# Patient Record
Sex: FEMALE | Race: OTHER | HISPANIC OR LATINO | Employment: UNEMPLOYED | URBAN - METROPOLITAN AREA
[De-identification: names, ages, dates, MRNs, and addresses within clinical notes are randomized per-mention and may not be internally consistent; named-entity substitution may affect disease eponyms.]

---

## 2023-05-26 ENCOUNTER — APPOINTMENT (EMERGENCY)
Dept: RADIOLOGY | Facility: HOSPITAL | Age: 28
End: 2023-05-26

## 2023-05-26 ENCOUNTER — HOSPITAL ENCOUNTER (EMERGENCY)
Facility: HOSPITAL | Age: 28
Discharge: HOME | End: 2023-05-26
Attending: EMERGENCY MEDICINE

## 2023-05-26 ENCOUNTER — APPOINTMENT (EMERGENCY)
Dept: NON INVASIVE DIAGNOSTICS | Facility: HOSPITAL | Age: 28
End: 2023-05-26

## 2023-05-26 VITALS
RESPIRATION RATE: 18 BRPM | OXYGEN SATURATION: 100 % | DIASTOLIC BLOOD PRESSURE: 85 MMHG | TEMPERATURE: 98.9 F | SYSTOLIC BLOOD PRESSURE: 138 MMHG | HEART RATE: 90 BPM

## 2023-05-26 DIAGNOSIS — M25.571 RIGHT ANKLE PAIN: Primary | ICD-10-CM

## 2023-05-26 DIAGNOSIS — M79.661 PAIN IN RIGHT LOWER LEG: ICD-10-CM

## 2023-05-26 RX ORDER — NAPROXEN 500 MG/1
500 TABLET ORAL 2 TIMES DAILY WITH MEALS
Qty: 30 TABLET | Refills: 0 | Status: SHIPPED | OUTPATIENT
Start: 2023-05-26

## 2023-05-26 RX ORDER — METHOCARBAMOL 750 MG/1
750 TABLET, FILM COATED ORAL EVERY 6 HOURS PRN
Qty: 10 TABLET | Refills: 0 | Status: SHIPPED | OUTPATIENT
Start: 2023-05-26

## 2023-05-26 RX ORDER — METHOCARBAMOL 500 MG/1
500 TABLET, FILM COATED ORAL ONCE
Status: COMPLETED | OUTPATIENT
Start: 2023-05-26 | End: 2023-05-26

## 2023-05-26 RX ORDER — KETOROLAC TROMETHAMINE 30 MG/ML
15 INJECTION, SOLUTION INTRAMUSCULAR; INTRAVENOUS ONCE
Status: COMPLETED | OUTPATIENT
Start: 2023-05-26 | End: 2023-05-26

## 2023-05-26 RX ADMIN — METHOCARBAMOL 500 MG: 500 TABLET ORAL at 13:41

## 2023-05-26 RX ADMIN — KETOROLAC TROMETHAMINE 15 MG: 30 INJECTION, SOLUTION INTRAMUSCULAR; INTRAVENOUS at 13:42

## 2023-05-26 NOTE — DISCHARGE INSTRUCTIONS
INSTRUCCIONES DE DESCARGA:    SEGUIMIENTO CON SUÁREZ PROVEEDOR DE ATENCIÓN PRIMARIA O LA CLÍNICA DE GERBER [address]  TOD MARIN SOCORRO PARA SER ATENDIDOS  TOME LA MEDICACIÓN SEGÚN LA PRESCRIPCIÓN  EN SUNNI DE Erupción, dificultad para respirar o dificultad para tragar, DEJE DE THALIA EL MEDICAMENTO Y HÁGASE NOHEMI  [address]  TOD MARIN SOCORRO PARA SER ATENDIDOS  [place] ELEVA LA OLIVIA  SI LOS SÍNTOMAS EMPEORAN O SURGEN NUEVOS SÍNTOMAS, [address] A LA URGENCIA PARA QUE LO [address] [facility]  DISCHARGE INSTRUCTIONS:    FOLLOW UP WITH YOUR PRIMARY CARE PROVIDER OR  [facility]  MAKE AN APPOINTMENT TO BE SEEN  TAKE MEDICATION AS PRESCRIBED  IF RASH, SHORTNESS OF BREATH OR TROUBLE SWALLOWING, STOP TAKING THE MEDICATION AND BE SEEN  FOLLOW UP WITH THE RECOMMENDED ORTHOPEDIC SPECIALIST  MAKE AN APPOINTMENT TO BE SEEN  REST, ICE AND ELEVATE THE AREA  IF SYMPTOMS WORSEN OR NEW SYMPTOMS ARISE, RETURN TO THE ER TO BE SEEN

## 2023-05-26 NOTE — ED PROVIDER NOTES
History  Chief Complaint   Patient presents with   • Foot Pain     Pt c/o right foot pain, ankle and knee pain  Denies injury  States that she can't bear weight on her right foot       28y  o female with no significant PMH presents to the ER for right leg pain for 2 days  Patient states she was working out at [employer] and after that began with leg pain  She tried taking a muscle relaxer for pain without relief  She describes her pain as sharp and non-radiating  Pain is constant but worse when bearing weight  She denies fever, chills, URI symptoms, chest pain, dyspnea, N/V/D, abdominal pain, weakness or paresthesias  She denies directly injuring the area  She does take birth control  She denies long plane/car rides, recent surgery, recent pregnancy or history of blood clots  History provided by:  Patient   used: Yes (Iizuu)        None       History reviewed  No pertinent past medical history  History reviewed  No pertinent surgical history  History reviewed  No pertinent family history  I have reviewed and agree with the history as documented  E-Cigarette/Vaping     E-Cigarette/Vaping Substances     Social History     Tobacco Use   • Smoking status: Never   • Smokeless tobacco: Never   Substance Use Topics   • Alcohol use: Yes     Comment: social   • Drug use: Never       Review of Systems   Constitutional: Negative for activity change, appetite change, chills and fever  HENT: Negative for congestion, drooling, ear discharge, ear pain, facial swelling, rhinorrhea and sore throat  Eyes: Negative for redness  Respiratory: Negative for cough and shortness of breath  Cardiovascular: Negative for chest pain  Gastrointestinal: Negative for abdominal pain, diarrhea, nausea and vomiting  Musculoskeletal: Negative for neck stiffness  Skin: Negative for rash  Allergic/Immunologic: Negative for food allergies  Neurological: Negative for weakness and numbness  Physical Exam  Physical Exam  Vitals and nursing note reviewed  Constitutional:       General: She is not in acute distress  Appearance: She is not toxic-appearing  HENT:      Head: Normocephalic and atraumatic  Eyes:      Conjunctiva/sclera: Conjunctivae normal    Neck:      Trachea: No tracheal deviation  Cardiovascular:      Rate and Rhythm: Normal rate and regular rhythm  Heart sounds: Normal heart sounds, S1 normal and S2 normal  No murmur heard  No friction rub  No gallop  Pulmonary:      Effort: Pulmonary effort is normal  No respiratory distress  Breath sounds: Normal breath sounds  No decreased breath sounds, wheezing, rhonchi or rales  Chest:      Chest wall: No tenderness  Abdominal:      General: There is no distension  Musculoskeletal:      Cervical back: Normal range of motion and neck supple  Right upper leg: Normal  No swelling, edema, deformity, lacerations, tenderness or bony tenderness  Right knee: Normal       Right lower leg: Tenderness (calf) present  No swelling or deformity  No edema  Right ankle: No swelling, deformity, ecchymosis or lacerations  Tenderness present over the lateral malleolus and medial malleolus  Decreased range of motion (due to pain)  Normal pulse  Right foot: Normal range of motion  Tenderness and bony tenderness present  No swelling, deformity, laceration or crepitus  Normal pulse  Comments: Right calf, right ankle and right foot are tender to palpation  No erythema, swelling, ecchymosis or deformity  Normal ROM of knee and toes  Decreased ROM of ankle due to pain  Neurovascularly intact  Skin:     General: Skin is warm and dry  Findings: No rash  Neurological:      Mental Status: She is alert  GCS: GCS eye subscore is 4  GCS verbal subscore is 5  GCS motor subscore is 6     Psychiatric:         Mood and Affect: Mood normal          Vital Signs  ED Triage Vitals   Temperature Pulse Respirations Blood Pressure SpO2   05/26/23 1301 05/26/23 1301 05/26/23 1301 05/26/23 1301 05/26/23 1301   98 9 °F (37 2 °C) 90 18 138/85 100 %      Temp Source Heart Rate Source Patient Position - Orthostatic VS BP Location FiO2 (%)   05/26/23 1301 05/26/23 1301 -- -- --   Oral Monitor         Pain Score       05/26/23 1342       10 - Worst Possible Pain           Vitals:    05/26/23 1301   BP: 138/85   Pulse: 90         Visual Acuity      ED Medications  Medications   ketorolac (TORADOL) injection 15 mg (15 mg Intramuscular Given 5/26/23 1342)   methocarbamol (ROBAXIN) tablet 500 mg (500 mg Oral Given 5/26/23 1341)       Diagnostic Studies  Results Reviewed     None                 XR tibia fibula 2 views RIGHT   ED Interpretation by Claire Tran PA-C (05/26 1423)   No acute abnormalities seen by me at this time  XR foot 3+ views RIGHT   ED Interpretation by Claire Tran PA-C (05/26 1423)   No acute abnormalities seen by me at this time  VAS lower limb venous duplex study, unilateral/limited    (Results Pending)              Procedures  Orthopedic injury treatment    Date/Time: 5/26/2023 2:30 PM    Performed by: Claire Tran PA-C  Authorized by: Claire Tran PA-C    Patient Location:  ED  [place] Protocol:  Procedure performed by: (ED RN)  Consent: Verbal consent obtained  Consent given by: patient  Patient understanding: patient states understanding of the procedure being performed  Radiology Images displayed and confirmed  If images not available, report reviewed: imaging studies available  Patient identity confirmed: arm band      Injury location:  Ankle  Location details:  Right ankle  Injury type:   Soft tissue  Neurovascular status: Neurovascularly intact    Distal perfusion: normal    Neurological function: normal    Range of motion: reduced    Local anesthesia used?: No    General anesthesia used?: No    Immobilization:  Crutches and other (comment) (air cast)  Neurovascular status: Neurovascularly intact    Distal perfusion: normal    Neurological function: normal    Range of motion: unchanged    Patient tolerance:  Patient tolerated the procedure well with no immediate complications             ED Course  ED Course as of 05/26/23 1510   Fri May 26, 2023   1430 VAS lower limb venous duplex study, unilateral/limited  Per US technician, patient is negative for DVT  Medical Decision Making  28y  o female presents to the ER for right leg pain for 2 days  Vitals are stable  Patient is in no acute distress  On exam, lungs are clear  Heart is regular rate and rhythm  Abdomen is not distended  Right calf, right ankle and right foot are tender to palpation  No erythema, swelling, ecchymosis or deformity  Normal ROM of knee and toes  Decreased ROM of ankle due to pain  Neurovascularly intact  Will check xray and duplex  1430 VAS lower limb venous duplex study, unilateral/limited - Per US technician, patient is negative for DVT  1430    Informed patient I did not see any acute abnormalities on xray at this time and if the radiologist saw anything concerning when reading the xray, we would call to inform them  Patient agreeable  Will place in air cast and give crutches  Patient reports improvement in symptoms with medication  Will discharge home with medication  The management plan was discussed in detail with the patient at bedside and all questions were answered  Prior to discharge, we provided both verbal and written instructions  We discussed with the patient the signs and symptoms for which to return to the emergency department  All questions were answered and patient was comfortable with the plan of care and discharged to home  Instructed the patient to follow up with the primary care provider and/or specialist provided and their written instructions    The patient verbalized understanding of our discussion and plan of care, and agrees to return to the Emergency Department for concerns and progression of illness  At discharge, I instructed the patient to:  -follow up with pcp  -take Naproxen and Robaxin as prescribed  -rest, ice and elevate ankle  -follow up with orthopedics  -return to the ER if symptoms worsened or new symptoms arose  Patient agreed to this plan and was stable at time of discharge  Pain in right lower leg: acute illness or injury  Right ankle pain: acute illness or injury  Amount and/or Complexity of Data Reviewed  Independent Historian:      Details: patient is historian  Radiology: ordered and independent interpretation performed  Decision-making details documented in ED Course  Risk  Prescription drug management  Disposition  Final diagnoses:   Right ankle pain   Pain in right lower leg     Time reflects when diagnosis was documented in both MDM as applicable and the Disposition within this note     Time User Action Codes Description Comment    5/26/2023  2:31 PM Nicholas Leung [address] Right ankle pain     5/26/2023  2:31 PM Odilia LIMON Add [D60 922] Pain in right lower leg       ED Disposition     ED Disposition   Discharge    Condition   Stable    Date/Time   Fri May 26, 2023  2:31 PM    Comment   Danny Burdick discharge to home/self care                 Follow-up Information     Follow up With Specialties Details Why Contact Info Additional [address] [facility] Schedule an appointment as soon as possible for a visit  As needed [address], [address] [facility] [address]  [address], [address], [address] Orthopedic Surgery Schedule an appointment as soon as possible for a visit   [address]  Elie [address] [facility] [address]  [address] Specialists tana, [address], [address] [facility], [place], [place], [address] [phone]          Discharge Medication List as of 5/26/2023  2:47 PM      START taking these medications    Details   methocarbamol (ROBAXIN) 750 mg tablet Take 1 tablet (750 mg total) by mouth every 6 (six) hours as needed for muscle spasms, Starting Fri 5/26/2023, Normal      naproxen (NAPROSYN) 500 mg tablet Take 1 tablet (500 mg total) by mouth 2 (two) times a day with meals, Starting Fri 5/26/2023, Normal             No discharge procedures on file      PDMP Review     None          ED Provider  Electronically Signed by           Irasema Gaitan PA-C  05/26/23 2571

## 2023-05-30 ENCOUNTER — HOSPITAL ENCOUNTER (EMERGENCY)
Facility: HOSPITAL | Age: 28
Discharge: HOME | End: 2023-05-30
Attending: EMERGENCY MEDICINE | Admitting: EMERGENCY MEDICINE

## 2023-05-30 VITALS
DIASTOLIC BLOOD PRESSURE: 84 MMHG | TEMPERATURE: 98.4 F | SYSTOLIC BLOOD PRESSURE: 111 MMHG | HEIGHT: 68 IN | BODY MASS INDEX: 33.55 KG/M2 | RESPIRATION RATE: 16 BRPM | OXYGEN SATURATION: 99 % | HEART RATE: 85 BPM | WEIGHT: 221.34 LBS

## 2023-05-30 DIAGNOSIS — S93.491D SPRAIN OF ANTERIOR TALOFIBULAR LIGAMENT OF RIGHT ANKLE, SUBSEQUENT ENCOUNTER: Primary | ICD-10-CM

## 2023-05-30 RX ORDER — KETOROLAC TROMETHAMINE 30 MG/ML
15 INJECTION, SOLUTION INTRAMUSCULAR; INTRAVENOUS ONCE
Status: COMPLETED | OUTPATIENT
Start: 2023-05-30 | End: 2023-05-30

## 2023-05-30 RX ADMIN — KETOROLAC TROMETHAMINE 15 MG: 30 INJECTION, SOLUTION INTRAMUSCULAR; INTRAVENOUS at 11:24

## 2023-05-30 NOTE — ED ATTENDING ATTESTATION
5/30/2023  IWalter MD, saw and evaluated the patient  I have discussed the patient with the resident/non-physician practitioner and agree with the resident's/non-physician practitioner's findings, Plan of Care, and MDM as documented in the resident's/non-physician practitioner's note, except where noted  All available labs and Radiology studies were reviewed  I was present for key portions of any procedure(s) performed by the resident/non-physician practitioner and I was immediately available to provide assistance  At this point I agree with the current assessment done in the Emergency Department  I have conducted an independent evaluation of this patient a history and physical is as follows:    30 y/o F presents for re-evaluation of R ankle pain  Pt was seen for evaluaiton of ankle pain on 5/26 with negative ankle xrays  Pt reports persistent pain worse with ambulation  10 systems reviewed and otherwise negative    On exam no distress, lungs nml, cardiac nml, abd nml, pitting, R knee exam wnl, R ankle w/o swelling, redness, warmth, point ttp, nml, achilles exam  MDM: R ankle pain likely 2/2 sprain/strain, no hx/exam findings to suggest infectious etiology/gout, will place in splint, podiatry f/u    ED Course         Critical Care Time  Procedures

## 2023-05-30 NOTE — ED PROVIDER NOTES
History  Chief Complaint   Patient presents with   • Ankle Pain     Increased Rt ankle pain, seen her 5/26/23  Did not make appointment with orthopedic as advised     Patient is a 28 y/o Turkish speaking female presenting complaining of right ankle pain x 1 week  She states that she was recently seen in the ED for the same issue last week, and was discharged with follow up with orthopedics  She states that she was unable to make this appointment due to the holiday weekend  She presents today complaining of 9/10 sharp pain to both medial and lateral ankle, constant in nature and worse with weightbearing and pressure  She states that she has been taking Naproxen and last admission she received an injection, both of which help reduce her pain  She denies any trauma leading to her ankle pain, and states that last week she was at the gym days prior to her pain starting, however does not recall any injury or overuse  History provided by:  Patient   used: Yes        Prior to Admission Medications   Prescriptions Last Dose Informant Patient Reported? Taking? methocarbamol (ROBAXIN) 750 mg tablet   No No   Sig: Take 1 tablet (750 mg total) by mouth every 6 (six) hours as needed for muscle spasms   naproxen (NAPROSYN) 500 mg tablet   No No   Sig: Take 1 tablet (500 mg total) by mouth 2 (two) times a day with meals      Facility-Administered Medications: None       History reviewed  No pertinent past medical history  History reviewed  No pertinent surgical history  History reviewed  No pertinent family history  I have reviewed and agree with the history as documented      E-Cigarette/Vaping   • E-Cigarette Use Never User      E-Cigarette/Vaping Substances     Social History     Tobacco Use   • Smoking status: Never   • Smokeless tobacco: Never   Vaping Use   • Vaping Use: Never used   Substance Use Topics   • Alcohol use: Yes     Comment: social   • Drug use: Never        Review of Systems Constitutional: Negative  HENT: Negative  Respiratory: Negative  Cardiovascular: Negative  Musculoskeletal:        Right ankle and foot pain  Skin: Negative  Neurological: Negative  Physical Exam  ED Triage Vitals [05/30/23 1016]   Temperature Pulse Respirations Blood Pressure SpO2   98 4 °F (36 9 °C) 85 16 111/84 99 %      Temp src Heart Rate Source Patient Position - Orthostatic VS BP Location FiO2 (%)   -- Monitor -- -- --      Pain Score       10 - Worst Possible Pain             Orthostatic Vital Signs  Vitals:    05/30/23 1016   BP: 111/84   Pulse: 85       Physical Exam  Vitals reviewed  Constitutional:       General: She is not in acute distress  HENT:      Head: Normocephalic and atraumatic  Cardiovascular:      Rate and Rhythm: Normal rate and regular rhythm  Pulses: Normal pulses  Pulmonary:      Effort: Pulmonary effort is normal       Breath sounds: Normal breath sounds  Musculoskeletal:      Comments: Pain on palpation of medial and lateral malleolus right ankle  Pain with DF/PF of the right ankle joint  Pain on palpation of the ATFL and CFL ligaments on the right  Stress inversion and anterior drawer positive for pain on the right  No gross deformity present on the right ankle  Motor function to digits intact  Skin:     General: Skin is warm and dry  Capillary Refill: Capillary refill takes less than 2 seconds  Findings: No bruising or erythema  Neurological:      General: No focal deficit present  Mental Status: She is alert and oriented to person, place, and time           ED Medications  Medications   ketorolac (TORADOL) injection 15 mg (15 mg Intramuscular Given 5/30/23 1124)       Diagnostic Studies  Results Reviewed     None                 No orders to display         Procedures  Orthopedic injury treatment    Date/Time: 5/30/2023 11:29 AM    Performed by: Juana Walsh DPM  Authorized by: Juana Walsh DPM Patient Location:  ED  [place] Protocol:  Consent: Verbal consent obtained  Risks and benefits: risks, benefits and alternatives were discussed  Consent given by: patient      Injury location:  Ankle  Location details:  Right ankle  Injury type: Soft tissue  Neurovascular status: Neurovascularly intact    Distal perfusion: normal    Neurological function: normal    Range of motion: reduced    Local anesthesia used?: No    General anesthesia used?: No    Skeletal traction used?: No    Immobilization:  Splint  Splint type:  Short leg  Supplies used:  Cotton padding, elastic bandage and fiberglass  Neurovascular status: Neurovascularly intact    Distal perfusion: normal    Neurological function: normal    Range of motion: unchanged    Patient tolerance:  Patient tolerated the procedure well with no immediate complications          ED Course                             SBIRT 20yo+    Flowsheet Row Most Recent Value   Initial Alcohol Screen: US AUDIT-C     1  How often do you have a drink containing alcohol? 0 Filed at: 05/30/2023 1039   2  How many drinks containing alcohol do you have on a typical day you are drinking? 0 Filed at: 05/30/2023 1039   3b  FEMALE Any Age, or MALE 65+: How often do you have 4 or more drinks on one occassion? 0 Filed at: 05/30/2023 1039   Audit-C Score 0 Filed at: 05/30/2023 1039   GEOVANNA: How many times in the past year have you    Used an illegal drug or used a prescription medication for non-medical reasons? Never Filed at: 05/30/2023 1039                Medical Decision Making  Patient stable for discharge with follow up with podiatry outpatient for right ankle sprain  Posterior splint applied to right lower extremity to immobilize and protect, patient can ambulate with crutches and recommend NWB to RLE  Discussed rest/ice/compression/elevation of the RLE for pain and swelling  Injection of 15mg Toradol IM for pain relief in ED   Recommend use of OTC Tylenol and Ibuprofen for pain/inflammation  Risk  Prescription drug management  Disposition  Final diagnoses:   Sprain of anterior talofibular ligament of right ankle, subsequent encounter     Time reflects when diagnosis was documented in both MDM as applicable and the Disposition within this note     Time User Action Codes Description Comment    5/30/2023 11:05 AM Annalee Shaffer Add [L75 785G] Sprain of anterior talofibular ligament of right ankle, subsequent encounter       ED Disposition     ED Disposition   Discharge    Condition   Good    Date/Time   Tue May 30, 2023 11:04 AM    Comment   Jesus Cronin discharge to home/self care  Follow-up Information     Follow up With Specialties Details Why Contact Info    Yasmeen Pinto DPM Podiatry, Wound Care Schedule an appointment as soon as possible for a visit   [address] [place], [place] [id number]  [phone]            Discharge Medication List as of 5/30/2023 11:07 AM      CONTINUE these medications which have NOT CHANGED    Details   methocarbamol (ROBAXIN) 750 mg tablet Take 1 tablet (750 mg total) by mouth every 6 (six) hours as needed for muscle spasms, Starting Fri 5/26/2023, Normal      naproxen (NAPROSYN) 500 mg tablet Take 1 tablet (500 mg total) by mouth 2 (two) times a day with meals, Starting Fri 5/26/2023, Normal               PDMP Review     None           ED Provider  Attending physically available and evaluated Jesus Cronin I managed the patient along with the ED Attending      Electronically Signed by         Janet Valente DPM  05/30/23 4770

## 2023-06-05 ENCOUNTER — OFFICE VISIT (OUTPATIENT)
Dept: PODIATRY | Facility: CLINIC | Age: 28
End: 2023-06-05

## 2023-06-05 VITALS
HEIGHT: 68 IN | DIASTOLIC BLOOD PRESSURE: 78 MMHG | WEIGHT: 221 LBS | BODY MASS INDEX: 33.49 KG/M2 | HEART RATE: 85 BPM | SYSTOLIC BLOOD PRESSURE: 145 MMHG

## 2023-06-05 DIAGNOSIS — S93.491D SPRAIN OF ANTERIOR TALOFIBULAR LIGAMENT OF RIGHT ANKLE, SUBSEQUENT ENCOUNTER: ICD-10-CM

## 2023-06-05 DIAGNOSIS — S99.911A RIGHT ANKLE INJURY, INITIAL ENCOUNTER: Primary | ICD-10-CM

## 2023-06-05 DIAGNOSIS — R52 SEVERE PAIN: ICD-10-CM

## 2023-06-05 PROCEDURE — 99203 OFFICE O/P NEW LOW 30 MIN: CPT | Performed by: PODIATRIST

## 2023-06-05 RX ORDER — METHYLPREDNISOLONE 4 MG/1
TABLET ORAL
Qty: 1 EACH | Refills: 0 | Status: SHIPPED | OUTPATIENT
Start: 2023-06-05

## 2023-06-05 NOTE — PROGRESS NOTES
Assessment/Plan:     Diagnoses and all orders for this visit:    Right ankle injury, initial encounter  -     Cam Boot  -     MRI ankle/heel right wo contrast; Future  -     methylPREDNISolone 4 MG tablet therapy pack; Use as directed on package    Severe pain  -     MRI ankle/heel right wo contrast; Future  -     methylPREDNISolone 4 MG tablet therapy pack; Use as directed on package        Diagnosis and options discussed with patient  Patient agreeable to the plan as stated below  Interpretor [id number] used through visit  Negative for DVT performed in ED  I reviewed both ED visits    Patient has severe ankle pain with no explainable cause  XRays normal  IT is causing her to cry even without touching the ankle  I do not have a reasonable explanation for her pain but I am recommending an MRI    RICE protocol instructions given in Pashto  Medrol taper prescribed, take as written in a blister pack  RTC 2 weeks, review MRI, check progress  Until then she has boot and crutches, rest      Subjective:      Patient ID: Carmel Pryor is a 29 y o  female  Patient presents with severe right ankle pain  She has been to the Emergency room twice  She began having ankle pain 5/24/2023 but didn't get injured  The pain just showed up out of nowhere  She went to the ED 5/26/2023, XRays were normal  She went back a few days later because the pain was so severe  She was placed in a splint and given naproxen which doesn't help  Interpretor [id number] used through visit      The following portions of the patient's history were reviewed and updated as appropriate: allergies, current medications, past family history, past medical history, past social history, past surgical history and problem list     Review of Systems    Constitutional: Negative  Respiratory: Negative for cough and shortness of breath  Gastrointestinal: Negative for diarrhea, nausea and vomiting     Musculoskeletal: severe ankle pain  Skin: Negative for "rash or wound  Neurological: Negative for weakness, numbness and headaches  Objective:      /78   Pulse 85   Ht 5' 8\" (1 727 m)   Wt 100 kg (221 lb)   LMP  (LMP Unknown)   BMI 33 60 kg/m²          Physical Exam  Vitals reviewed  Constitutional:       Appearance: She is obese  She is not ill-appearing or diaphoretic  Cardiovascular:      Rate and Rhythm: Normal rate  Pulses: Normal pulses  Pulmonary:      Effort: Pulmonary effort is normal  No respiratory distress  Musculoskeletal:         General: Tenderness (she has severe pain from TMTJ up to mid calf with no specificity  Any gentle touch is severely painful) present  Skin:     Capillary Refill: Capillary refill takes less than 2 seconds  Neurological:      Mental Status: She is alert  Any palpation of the right ankle from calf to midfoot causes her to scream and cry in pain  I can palpate the achilles, it does not seem ruptured but her pain is so severe I cannot fully evaluate  I cannot stress her ankle due to pain        Her XRays are normal  I reviewed this with the patient  "

## 2023-06-05 NOTE — PATIENT INSTRUCTIONS
P  R I C E  Tratamiento   CUIDADO AMBULATORIO:   El tratamiento P R I C  E es un proceso de 5 pasos que se [place] para reducir la inflamación y el dolor a causa de rachelle lesión  P R I C E  son las siglas en inglés de proteger, descansar, aplicar hielo, comprimir y elevar  Inicie el tratamiento P R I C E  Cathaleen Camper 24 y 50 horas [address]  Busque atención médica de inmediato si:  Siente mucho dolor  Si tiene rachelle inflamación grave o rachelle deformidad  Usted tiene entumecida la ashley lesionada  Llame a crawley médico si:  El dolor y la inflamación no desaparecen después de algunos días  Usted tiene preguntas o inquietudes acerca de crawley condición o cuidado  Cómo utilizar el tratamiento P R I C E :      Proteja crawley lesión de [place]  Sostenga la ashley lesionada con un soporte o Yoana Toledo  Crawley médico le dirá por cuánto tiempo usar el soporte o férula cada día  Descanse el área lesionada liz se le indique  Es posible que deba dejar de usar peso o de ejercer peso en la lesión chel 50 horas o [place]  Crawley médico puede recomendarle que use muletas u otro dispositivo  Regrese a kin [employer] se le indique  Aplique hielo en el área lesionada de 15 a 20 minutos cada 4 horas o liz se le indique  Use rachelle compresa de hielo o ponga hielo triturado en rachelle bolsa de plástico  Mellody Mates la bolsa con rachelle toalla antes de aplicarla en la piel  El hielo ayuda a evitar daño al tejido y a disminuir la inflamación y el dolor  Compresión (mantener presión) sobre el área lesionada  La compresión le ayudará a desinflamar el área lesionada y a apoyarla  Use un vendaje elástico, un estribo de aire, rachelle férula o un cabestrillo liz se lo hayan indicado  Si utiliza un vendaje elástico, asegúrese de que el vendaje no esté demasiado ajustado  Debería poder deslizar 2 dedos entre el vendaje y la piel  Eleve el área lesionada por encima del nivel del corazón tan seguido liz pueda  [place] va a disminuir inflamación y el dolor  Coloque el área lesionada sobre almohadas o cobijas para mantenerla elevada cómodamente  Acuda a la consulta de control con crawley médico según las indicaciones: Anote kin preguntas para que se acuerde de hacerlas chel kin visitas  © Copyright Annye Slaughter 2022 Information is for End User's use only and may not be sold, redistributed or otherwise used for commercial purposes  Esta información es sólo para uso en educación  Crawley intención no es darle un consejo médico sobre enfermedades o tratamientos  Colsulte con crawley Lars Chock farmacéutico antes de seguir cualquier régimen médico para saber si es seguro y efectivo para usted

## 2023-06-05 NOTE — LETTER
June 5, 2023     Patient: Tyler Garcia  YOB: 1995  Date of Visit: 6/5/2023      To Whom it May Concern:    Tyler Garcia is under my professional care  Arianne Easley was seen in my office on 6/5/2023  Yousifsarwat Tracee has severe right ankle injury  She is restricted to nonweightbearing to the right ankle for now  I am obtaining an MRI of her ankle to evaluate what was injured but at least for the next 4 weeks she cannot work  She has to be fully nonweightbearing to the right foot and ankle    If you have any questions or concerns, please don't hesitate to call           Sincerely,          Jsoé Luis Batista DPM        CC: No Recipients

## 2023-06-07 ENCOUNTER — HOSPITAL ENCOUNTER (EMERGENCY)
Facility: HOSPITAL | Age: 28
Discharge: HOME | End: 2023-06-07
Attending: EMERGENCY MEDICINE

## 2023-06-07 VITALS
OXYGEN SATURATION: 98 % | HEART RATE: 87 BPM | TEMPERATURE: 98.2 F | RESPIRATION RATE: 18 BRPM | SYSTOLIC BLOOD PRESSURE: 143 MMHG | DIASTOLIC BLOOD PRESSURE: 94 MMHG

## 2023-06-07 DIAGNOSIS — M79.673 FOOT PAIN: Primary | ICD-10-CM

## 2023-06-07 LAB
BASOPHILS # BLD AUTO: 0.03 THOUSANDS/ÂΜL (ref 0–0.1)
BASOPHILS NFR BLD AUTO: 0 % (ref 0–1)
CRP SERPL QL: 109.5 MG/L
EOSINOPHIL # BLD AUTO: 0.04 THOUSAND/ÂΜL (ref 0–0.61)
EOSINOPHIL NFR BLD AUTO: 0 % (ref 0–6)
ERYTHROCYTE [DISTWIDTH] IN BLOOD BY AUTOMATED COUNT: 13.3 % (ref 11.6–15.1)
ERYTHROCYTE [SEDIMENTATION RATE] IN BLOOD: 126 MM/HOUR (ref 0–19)
HCT VFR BLD AUTO: 35.8 % (ref 34.8–46.1)
HGB BLD-MCNC: 11.8 G/DL (ref 11.5–15.4)
IMM GRANULOCYTES # BLD AUTO: 0.05 THOUSAND/UL (ref 0–0.2)
IMM GRANULOCYTES NFR BLD AUTO: 0 % (ref 0–2)
LYMPHOCYTES # BLD AUTO: 1.31 THOUSANDS/ÂΜL (ref 0.6–4.47)
LYMPHOCYTES NFR BLD AUTO: 11 % (ref 14–44)
MCH RBC QN AUTO: 26.8 PG (ref 26.8–34.3)
MCHC RBC AUTO-ENTMCNC: 33 G/DL (ref 31.4–37.4)
MCV RBC AUTO: 81 FL (ref 82–98)
MONOCYTES # BLD AUTO: 0.76 THOUSAND/ÂΜL (ref 0.17–1.22)
MONOCYTES NFR BLD AUTO: 6 % (ref 4–12)
NEUTROPHILS # BLD AUTO: 10.08 THOUSANDS/ÂΜL (ref 1.85–7.62)
NEUTS SEG NFR BLD AUTO: 83 % (ref 43–75)
NRBC BLD AUTO-RTO: 0 /100 WBCS
PLATELET # BLD AUTO: 435 THOUSANDS/UL (ref 149–390)
PMV BLD AUTO: 10.1 FL (ref 8.9–12.7)
RBC # BLD AUTO: 4.4 MILLION/UL (ref 3.81–5.12)
URATE SERPL-MCNC: 3.6 MG/DL (ref 2–7.5)
WBC # BLD AUTO: 12.27 THOUSAND/UL (ref 4.31–10.16)

## 2023-06-07 PROCEDURE — 84550 ASSAY OF BLOOD/URIC ACID: CPT | Performed by: PHYSICIAN ASSISTANT

## 2023-06-07 PROCEDURE — 85025 COMPLETE CBC W/AUTO DIFF WBC: CPT | Performed by: PHYSICIAN ASSISTANT

## 2023-06-07 PROCEDURE — 36415 COLL VENOUS BLD VENIPUNCTURE: CPT | Performed by: PHYSICIAN ASSISTANT

## 2023-06-07 PROCEDURE — 85652 RBC SED RATE AUTOMATED: CPT | Performed by: PHYSICIAN ASSISTANT

## 2023-06-07 PROCEDURE — 86140 C-REACTIVE PROTEIN: CPT | Performed by: PHYSICIAN ASSISTANT

## 2023-06-07 RX ORDER — HYDROCODONE BITARTRATE AND ACETAMINOPHEN 5; 325 MG/1; MG/1
1 TABLET ORAL ONCE
Status: COMPLETED | OUTPATIENT
Start: 2023-06-07 | End: 2023-06-07

## 2023-06-07 RX ORDER — HYDROCODONE BITARTRATE AND ACETAMINOPHEN 5; 325 MG/1; MG/1
1 TABLET ORAL EVERY 4 HOURS PRN
Qty: 10 TABLET | Refills: 0 | Status: SHIPPED | OUTPATIENT
Start: 2023-06-07

## 2023-06-07 RX ADMIN — HYDROCODONE BITARTRATE AND ACETAMINOPHEN 1 TABLET: 5; 325 TABLET ORAL at 12:10

## 2023-06-07 NOTE — ED PROVIDER NOTES
History  Chief Complaint   Patient presents with   • Foot Pain     Pt reports pain to foot, and medications are not helping  Patient is a 28 y/o female with no significant PMH who presents for evaluation of right ankle pain x 2 weeks  This is the patient's 3rd ED visit for same  She states she also followed up with podiatry Dr Paul Stout 2 days ago  She presents today complaining of 9/10 sharp pain to the foot and posterior ankle that is constant in nature and worse with weightbearing, movement, and palpation  There is associated swelling as well without rash, lesions, redness, or warmth  She states that she took robaxin and Naproxen after her first ED visit but doesn't have any refills  She states she saw podiatry 2 days ago and was given an rx for medrol dosepak which she has been taking without relief  She has not been taking other medication  She also was given a CAM walker boot to put on and wear once the swelling in her foot/ankle went down but states it hasn't gone down and she can't put it on due to pain  She states they are unsure of the cause of her severe pain and she is scheduled to get an MRI of the ankle/heel but states she couldn't wait any longer  She states she can't sleep or do anything due to the pain  She denies any trauma leading to her ankle pain, and states that  she was at the gym days prior to her pain starting and was experiencing some left leg pain and states maybe she was using/favoring her right foot/ankle but doesn't recall a specific incident that started the pain  She has had negative xrays of the tib/fib, foot, and a negative DVT study as well  She states no new injury/trauma  She denies fever, chest pain, SOB, abdominal pain, nausea, vomiting, diarrhea, numbness, weakness, redness, rash, bruising  No hx of IVDU  Prior to Admission Medications   Prescriptions Last Dose Informant Patient Reported? Taking?    methocarbamol (ROBAXIN) 750 mg tablet   No No   Sig: Take 1 tablet (750 mg total) by mouth every 6 (six) hours as needed for muscle spasms   Patient not taking: Reported on 6/5/2023   methylPREDNISolone 4 MG tablet therapy pack 6/7/2023  No Yes   Sig: Use as directed on package   naproxen (NAPROSYN) 500 mg tablet   No No   Sig: Take 1 tablet (500 mg total) by mouth 2 (two) times a day with meals      Facility-Administered Medications: None       History reviewed  No pertinent past medical history  History reviewed  No pertinent surgical history  History reviewed  No pertinent family history  I have reviewed and agree with the history as documented  E-Cigarette/Vaping   • E-Cigarette Use Never User      E-Cigarette/Vaping Substances     Social History     Tobacco Use   • Smoking status: Never   • Smokeless tobacco: Never   Vaping Use   • Vaping Use: Never used   Substance Use Topics   • Alcohol use: Yes     Comment: social   • Drug use: Never       Review of Systems   Constitutional: Negative for fever  Respiratory: Negative for shortness of breath  Cardiovascular: Negative for chest pain  Gastrointestinal: Negative for abdominal pain, diarrhea, nausea and vomiting  Musculoskeletal: Positive for arthralgias and joint swelling  Skin: Negative for color change, rash and wound  Neurological: Negative for weakness and numbness  All other systems reviewed and are negative  Physical Exam  Physical Exam  Vitals and nursing note reviewed  Constitutional:       Appearance: She is normal weight  She is not ill-appearing, toxic-appearing or diaphoretic  Comments: Patient tearful/crying laying in bed    HENT:      Head: Normocephalic and atraumatic  Right Ear: External ear normal       Left Ear: External ear normal    Eyes:      Conjunctiva/sclera: Conjunctivae normal    Cardiovascular:      Rate and Rhythm: Normal rate and regular rhythm  Heart sounds: Normal heart sounds  No murmur heard    Pulmonary:      Effort: Pulmonary effort is normal  No respiratory distress  Breath sounds: Normal breath sounds  No stridor  No wheezing, rhonchi or rales  Abdominal:      General: Abdomen is flat  There is no distension  Skin:     General: Skin is warm and dry  Capillary Refill: Capillary refill takes less than 2 seconds  Neurological:      General: No focal deficit present  Mental Status: She is alert  Psychiatric:         Mood and Affect: Mood normal          Vital Signs  ED Triage Vitals   Temperature Pulse Respirations Blood Pressure SpO2   06/07/23 1058 06/07/23 1058 06/07/23 1058 06/07/23 1058 06/07/23 1058   98 2 °F (36 8 °C) 87 18 143/94 98 %      Temp Source Heart Rate Source Patient Position - Orthostatic VS BP Location FiO2 (%)   06/07/23 1058 06/07/23 1058 06/07/23 1058 06/07/23 1058 --   Oral Monitor Lying Right arm       Pain Score       06/07/23 1210       10 - Worst Possible Pain           Vitals:    06/07/23 1058   BP: 143/94   Pulse: 87   Patient Position - Orthostatic VS: Lying         Visual Acuity      ED Medications  Medications   HYDROcodone-acetaminophen (NORCO) 5-325 mg per tablet 1 tablet (1 tablet Oral Given 6/7/23 1210)       Diagnostic Studies  Results Reviewed     None                 No orders to display              Procedures  Procedures         ED Course                                             Medical Decision Making  Reviewed prior ED notes, xrays and DVT study from 5/26, and recent podiatry note from 6/5/23  Discussed with podiatry, Dr Philly Eason    Amount and/or Complexity of Data Reviewed  External Data Reviewed: radiology and notes  Risk  Prescription drug management  Disposition  Final diagnoses:   None     ED Disposition     None      Follow-up Information    None         Patient's Medications   Discharge Prescriptions    No medications on file       No discharge procedures on file      PDMP Review     None          ED Provider  Electronically Signed by

## 2023-06-07 NOTE — ED PROVIDER NOTES
History  Chief Complaint   Patient presents with   • Foot Pain     Pt reports pain to foot, and medications are not helping  Patient is a 28 y/o female with no significant PMH who presents for evaluation of right ankle pain x 2 weeks  This is the patient's 3rd ED visit for same  She states she also followed up with podiatry Dr Torrey Velez 2 days ago  She presents today complaining of 9/10 sharp pain to the foot and posterior ankle that is constant in nature and worse with weightbearing, movement, and palpation  There is associated swelling as well without rash, lesions, redness, or warmth  She states that she took robaxin and Naproxen after her first ED visit but doesn't have any refills  She states she saw podiatry 2 days ago and was given an rx for medrol dosepak which she has been taking without relief  She has not been taking other medication  She also was given a CAM walker boot to put on and wear once the swelling in her foot/ankle went down but states it hasn't gone down and she can't put it on due to pain  She states they are unsure of the cause of her severe pain and she is scheduled to get an MRI of the ankle/heel but states she couldn't wait any longer  She states she can't sleep or do anything due to the pain  She denies any trauma leading to her ankle pain, and states that  she was at the gym days prior to her pain starting and was experiencing some left leg pain and states maybe she was using/favoring her right foot/ankle but doesn't recall a specific incident that started the pain  She has had negative xrays of the tib/fib, foot, and a negative DVT study as well  She states no new injury/trauma  She denies fever, chest pain, SOB, abdominal pain, nausea, vomiting, diarrhea, numbness, weakness, redness, rash, bruising  No hx of IVDU              Prior to Admission Medications   Prescriptions Last Dose Informant Patient Reported? Taking?    methocarbamol (ROBAXIN) 750 mg tablet   No No   Sig: Take 1 tablet (750 mg total) by mouth every 6 (six) hours as needed for muscle spasms   Patient not taking: Reported on 6/5/2023   methylPREDNISolone 4 MG tablet therapy pack 6/7/2023  No Yes   Sig: Use as directed on package   naproxen (NAPROSYN) 500 mg tablet   No No   Sig: Take 1 tablet (500 mg total) by mouth 2 (two) times a day with meals      Facility-Administered Medications: None       History reviewed  No pertinent past medical history  History reviewed  No pertinent surgical history  History reviewed  No pertinent family history  I have reviewed and agree with the history as documented  E-Cigarette/Vaping   • E-Cigarette Use Never User      E-Cigarette/Vaping Substances     Social History     Tobacco Use   • Smoking status: Never   • Smokeless tobacco: Never   Vaping Use   • Vaping Use: Never used   Substance Use Topics   • Alcohol use: Yes     Comment: social   • Drug use: Never       Review of Systems   Constitutional: Negative for chills and fever  Respiratory: Negative for shortness of breath  Cardiovascular: Negative for chest pain  Gastrointestinal: Negative for abdominal pain, diarrhea, nausea and vomiting  Musculoskeletal: Positive for arthralgias and joint swelling  Negative for back pain  Skin: Negative for rash  Neurological: Negative for weakness and numbness  All other systems reviewed and are negative  Physical Exam  Physical Exam  Vitals and nursing note reviewed  Constitutional:       General: She is not in acute distress  Appearance: Normal appearance  She is normal weight  She is not ill-appearing, toxic-appearing or diaphoretic  Comments: Patient is tearful/crying in pain    HENT:      Head: Normocephalic and atraumatic  Right Ear: External ear normal       Left Ear: External ear normal       Nose: Nose normal    Eyes:      Conjunctiva/sclera: Conjunctivae normal    Cardiovascular:      Rate and Rhythm: Normal rate and regular rhythm  Heart sounds: Normal heart sounds  No murmur heard  Pulmonary:      Effort: Pulmonary effort is normal  No respiratory distress  Breath sounds: Normal breath sounds  No stridor  No wheezing, rhonchi or rales  Abdominal:      General: Abdomen is flat  Bowel sounds are normal  There is no distension  Palpations: Abdomen is soft  Tenderness: There is no abdominal tenderness  There is no guarding  Musculoskeletal:      Cervical back: Normal range of motion  Feet:      Comments: Limited exam of right foot and ankle due to pain  Any palpation of the midfoot/lateral and posterior ankle causes pain  No specific medial or lateral malleolar pain to palpation  Generalized swelling to the ankle and foot without redness, warmth, ecchymosis, abrasion/laceration  Poor effort on ankle/toe ROM due to pain  NVI distally  Pedal pulses intact  No calf pain or swelling  No knee pain to palpation  Skin:     General: Skin is warm and dry  Capillary Refill: Capillary refill takes less than 2 seconds  Neurological:      General: No focal deficit present  Mental Status: She is alert     Psychiatric:         Mood and Affect: Mood normal          Vital Signs  ED Triage Vitals   Temperature Pulse Respirations Blood Pressure SpO2   06/07/23 1058 06/07/23 1058 06/07/23 1058 06/07/23 1058 06/07/23 1058   98 2 °F (36 8 °C) 87 18 143/94 98 %      Temp Source Heart Rate Source Patient Position - Orthostatic VS BP Location FiO2 (%)   06/07/23 1058 06/07/23 1058 06/07/23 1058 06/07/23 1058 --   Oral Monitor Lying Right arm       Pain Score       06/07/23 1210       10 - Worst Possible Pain           Vitals:    06/07/23 1058   BP: 143/94   Pulse: 87   Patient Position - Orthostatic VS: Lying         Visual Acuity      ED Medications  Medications   HYDROcodone-acetaminophen (NORCO) 5-325 mg per tablet 1 tablet (1 tablet Oral Given 6/7/23 1210)       Diagnostic Studies  Results Reviewed     Procedure Component Value Units Date/Time    Sedimentation rate, automated [[id number]]  (Abnormal) Collected: 06/07/23 1253    Lab Status: Final result Specimen: Blood from Arm, Right Updated: 06/07/23 1344     Sed Rate 126 mm/hour     C-reactive protein [[id number]]  (Abnormal) Collected: 06/07/23 1253    Lab Status: Final result Specimen: Blood from Arm, Right Updated: 06/07/23 1316      5 mg/L     Uric acid [[id number]]  (Normal) Collected: 06/07/23 1253    Lab Status: Final result Specimen: Blood from Arm, Right Updated: 06/07/23 1316     Uric Acid 3 6 mg/dL     Narrative:      N-acetyl-p-benzoquinone imine (metabolite of Acetaminophen) will generate erroneously low results in samples for patients that have taken an overdose of Acetaminophen      CBC and differential [[id number]]  (Abnormal) Collected: 06/07/23 1253    Lab Status: Final result Specimen: Blood from Arm, Right Updated: 06/07/23 1259     WBC 12 27 Thousand/uL      RBC 4 40 Million/uL      Hemoglobin 11 8 g/dL      Hematocrit 35 8 %      MCV 81 fL      MCH 26 8 pg      MCHC 33 0 g/dL      RDW 13 3 %      MPV 10 1 fL      Platelets 961 Thousands/uL      nRBC 0 /100 WBCs      Neutrophils Relative 83 %      Immat GRANS % 0 %      Lymphocytes Relative 11 %      Monocytes Relative 6 %      Eosinophils Relative 0 %      Basophils Relative 0 %      Neutrophils Absolute 10 08 Thousands/µL      Immature Grans Absolute 0 05 Thousand/uL      Lymphocytes Absolute 1 31 Thousands/µL      Monocytes Absolute 0 76 Thousand/µL      Eosinophils Absolute 0 04 Thousand/µL      Basophils Absolute 0 03 Thousands/µL                  No orders to display              Procedures  Procedures         ED Course  ED Course as of 06/07/23 2110 Wed Jun 07, 2023   1302 WBC(!): 12 27  Patient currently taking PO steroids (medrol dosepak)   1318 C-REACTIVE PROTEIN(!): 109 5   1428 Sed Rate(!): 126                                             Medical Decision Making  Reviewed prior ED notes, xrays and DVT study from 5/26, and recent podiatry note from 6/5/23  Discussed with podiatry, Dr Belem Aquino who also saw the patient in the ED  Discussed with other ED residents as well  Will order basic labs including CBC, ESR, CRP, and uric acid  Will give pain medication here  Patient has a ride home  Patient is currently scheduled for MRI on 6/9/23  Attempted to call MRI department for sooner appointment however they stated they might have an opening later tonight but can't promise  Discussed with podiatry, no need for further imaging at this time, will wait for scheduled MRI on 6/9/23  CBC with mild leukocytosis however patient on medrol dose pack  Sed rate and CRP elevated  Uric acid normal    Discussed results with podiatry  Pain control, supportive measures as previously discussed, continue medrol dose eliezer and keep scheduled appointment for MRI and follow up with podiatry outpatient in the office  Discussed all results and plan with patient  She states some improvement in pain after norco here  Will give IM toradol and send rx for short course of norco to the pharmacy  [place] and no recent rx found     The management plan was discussed in detail with the patient at bedside and all questions were answered  Sharon Mage to discharge, we provided both verbal and written instructions   We discussed with the patient the signs and symptoms for which to return to the emergency department   All questions were answered and patient was comfortable with the plan of care and discharged to home   Instructed the patient to follow up with the primary care provider and/or specialist provided and their written instructions   The patient verbalized understanding of our discussion and plan of care, and agrees to return to the Emergency Department for concerns and progression of illness      At discharge, I instructed the patient to:  -follow up with pcp  -follow up with the recommended specialists  -return to the ER if symptoms worsened or new symptoms arose  Patient agreed to this plan and was stable at time of discharge  Foot pain: acute illness or injury  Amount and/or Complexity of Data Reviewed  Labs: ordered  Decision-making details documented in ED Course  Risk  Prescription drug management  Disposition  Final diagnoses: Foot pain     Time reflects when diagnosis was documented in both MDM as applicable and the Disposition within this note     Time User Action Codes Description Comment    6/7/2023  3:00 PM Kianna Britton Add [E30 440] Foot pain       ED Disposition     ED Disposition   Discharge    Condition   Stable    Date/Time   Wed Jun 7, 2023  3:00 PM    Comment   Johnny Cuenca discharge to home/self care                 Follow-up Information     Follow up With Specialties Details Why Contact Info Additional [address] [facility] Schedule an appointment as soon as possible for a visit in 1 day  [address], [address] [facility] [id number]  [address], [address], [address] [place], [place], [facility]    Rosamaria Miller DPM Podiatry, Wound Care Schedule an appointment as soon as possible for a visit in 1 day  [address]  Second Floor  [address] [place]   [address] [facility] Emergency Department Emergency Medicine  If symptoms worsen [address] [facility] [id number]  [address] [facility], [address] [place] [address], [id number]          Discharge Medication List as of 6/7/2023  3:25 PM      START taking these medications    Details   HYDROcodone-acetaminophen (Norco) 5-325 mg per tablet Take 1 tablet by mouth every 4 (four) hours as needed for pain Max Daily Amount: 6 tablets, Starting Wed 6/7/2023, Normal         CONTINUE these medications which have NOT CHANGED    Details   methocarbamol (ROBAXIN) 750 mg tablet Take 1 tablet (750 mg total) by mouth every 6 (six) hours as needed for muscle spasms, Starting Fri 5/26/2023, Normal      methylPREDNISolone 4 MG tablet therapy pack Use as directed on package, Normal      naproxen (NAPROSYN) 500 mg tablet Take 1 tablet (500 mg total) by mouth 2 (two) times a day with meals, Starting Fri 5/26/2023, Normal             No discharge procedures on file      PDMP Review       Value Time User    PDMP Reviewed  Yes 6/7/2023  3:00 PM Delmy Paulino PA-C          ED Provider  Electronically Signed by           Delmy Paulino PA-C  06/07/23 1037

## 2023-06-09 ENCOUNTER — HOSPITAL ENCOUNTER (OUTPATIENT)
Dept: MRI IMAGING | Facility: HOSPITAL | Age: 28
Discharge: HOME | End: 2023-06-09
Attending: PODIATRIST

## 2023-06-09 DIAGNOSIS — R52 SEVERE PAIN: ICD-10-CM

## 2023-06-09 DIAGNOSIS — S99.911A RIGHT ANKLE INJURY, INITIAL ENCOUNTER: ICD-10-CM

## 2023-06-09 PROCEDURE — G1004 CDSM NDSC: HCPCS

## 2023-06-09 PROCEDURE — 73721 MRI JNT OF LWR EXTRE W/O DYE: CPT

## 2023-06-12 ENCOUNTER — OFFICE VISIT (OUTPATIENT)
Dept: PODIATRY | Facility: CLINIC | Age: 28
End: 2023-06-12

## 2023-06-12 VITALS
WEIGHT: 221 LBS | HEIGHT: 68 IN | SYSTOLIC BLOOD PRESSURE: 140 MMHG | DIASTOLIC BLOOD PRESSURE: 82 MMHG | BODY MASS INDEX: 33.49 KG/M2

## 2023-06-12 DIAGNOSIS — M79.661 PAIN IN RIGHT LOWER LEG: ICD-10-CM

## 2023-06-12 DIAGNOSIS — M25.571 SINUS TARSI SYNDROME OF RIGHT ANKLE: ICD-10-CM

## 2023-06-12 DIAGNOSIS — M77.8 FLEXOR HALLUCIS LONGUS TENDONITIS: Primary | ICD-10-CM

## 2023-06-12 DIAGNOSIS — S99.911A RIGHT ANKLE INJURY, INITIAL ENCOUNTER: ICD-10-CM

## 2023-06-12 PROCEDURE — 20550 NJX 1 TENDON SHEATH/LIGAMENT: CPT | Performed by: PODIATRIST

## 2023-06-12 PROCEDURE — 99213 OFFICE O/P EST LOW 20 MIN: CPT | Performed by: PODIATRIST

## 2023-06-12 RX ORDER — METHOCARBAMOL 750 MG/1
750 TABLET, FILM COATED ORAL EVERY 6 HOURS PRN
Qty: 30 TABLET | Refills: 1 | Status: SHIPPED | OUTPATIENT
Start: 2023-06-12

## 2023-06-12 RX ORDER — TRIAMCINOLONE ACETONIDE 40 MG/ML
40 INJECTION, SUSPENSION INTRA-ARTICULAR; INTRAMUSCULAR ONCE
Status: DISCONTINUED | OUTPATIENT
Start: 2023-06-12 | End: 2023-06-12

## 2023-06-12 RX ORDER — TRIAMCINOLONE ACETONIDE 40 MG/ML
40 INJECTION, SUSPENSION INTRA-ARTICULAR; INTRAMUSCULAR ONCE
Status: COMPLETED | OUTPATIENT
Start: 2023-06-12 | End: 2023-06-12

## 2023-06-12 RX ORDER — LIDOCAINE HYDROCHLORIDE 10 MG/ML
1 INJECTION, SOLUTION INFILTRATION; PERINEURAL ONCE
Status: COMPLETED | OUTPATIENT
Start: 2023-06-12 | End: 2023-06-12

## 2023-06-12 RX ADMIN — TRIAMCINOLONE ACETONIDE 40 MG: 40 INJECTION, SUSPENSION INTRA-ARTICULAR; INTRAMUSCULAR at 13:59

## 2023-06-12 RX ADMIN — LIDOCAINE HYDROCHLORIDE 1 ML: 10 INJECTION, SOLUTION INFILTRATION; PERINEURAL at 13:59

## 2023-06-12 NOTE — PROGRESS NOTES
"Assessment/Plan:       Diagnoses and all orders for this visit:    Flexor hallucis longus tendonitis  -     Discontinue: triamcinolone acetonide (KENALOG-40) 40 mg/mL injection 40 mg  -     lidocaine (XYLOCAINE) 1 % injection 1 mL  -     Foot injection  -     triamcinolone acetonide (KENALOG-40) 40 mg/mL injection 40 mg    Sinus tarsi syndrome of right ankle    Right ankle injury, initial encounter    Pain in right lower leg  -     methocarbamol (ROBAXIN) 750 mg tablet; Take 1 tablet (750 mg total) by mouth every 6 (six) hours as needed for muscle spasms      Diagnosis and options discussed with patient  Patient agreeable to the plan as stated below  I personally went into her MRI images  Some evidence of sinus tarsi syndrome  She has clear FHL tendonitis  Neither of these fully explain the severe pain  Discussed a cortisone injection into the FHL sheath  She did have some improvement from the injection  I am hoping this will decrease some of the inflammation  Her severe pain is somewhat unusual and making exam difficult but this did seem to make some headway  RTC 2 weeks  Stay in boot until then  Foot injection     Date/Time 6/12/2023 1:30 PM     Performed by  Leydi Marion DPM   Authorized by Leydi Marion DPM     Universal Protocol   Consent: Verbal consent obtained  Risks and benefits: risks, benefits and alternatives were discussed  Consent given by: patient  Time out: Immediately prior to procedure a \"time out\" was called to verify the correct patient, procedure, equipment, support staff and site/side marked as required    Timeout called at: 6/12/2023 1:52 PM   Patient understanding: patient states understanding of the procedure being performed  Patient identity confirmed: verbally with patient        Local anesthesia used: yes      Anesthesia: local infiltration     Anesthesia   Local anesthesia used: yes  Local Anesthetic: lidocaine 1% without epinephrine     Procedure Details " "  Procedure Notes: 0 5cc kenalog 40 and 1cc 1% lidocaine plain injected into FHL tendon sheath    Patient tolerance: patient tolerated the procedure well with no immediate complications                 Subjective:      Patient ID: Kristen Suárez is a 29 y o  female  6/5/2023: Patient presents with severe right ankle pain  She has been to the Emergency room twice  She began having ankle pain 5/24/2023 but didn't get injured  The pain just showed up out of nowhere  She went to the ED 5/26/2023, XRays were normal  She went back a few days later because the pain was so severe  She was placed in a splint and given naproxen which doesn't help  6/12/2023  PAtient was ordered an MRI for her severe ankle pain  It is not getting better  A steroid taper didn't help  She has taken numerous pain medications, muscle relaxers and NSAIDs without relief  Pain is severe surrounding the entire ankle  She is unable to put any weight on the ankle  The following portions of the patient's history were reviewed and updated as appropriate: allergies, current medications, past family history, past medical history, past social history, past surgical history and problem list     Review of Systems    Constitutional: Negative  Respiratory: Negative for cough and shortness of breath  Gastrointestinal: Negative for diarrhea, nausea and vomiting  Musculoskeletal: severe right ankle pain  Skin: Negative for rash or wound  Neurological: Negative for weakness, numbness and headaches  Objective:      /82   Ht 5' 8\" (1 727 m)   Wt 100 kg (221 lb)   LMP  (LMP Unknown)   BMI 33 60 kg/m²          Physical Exam  Vitals reviewed  Constitutional:       General: She is not in acute distress  Appearance: She is not toxic-appearing  Cardiovascular:      Rate and Rhythm: Normal rate  Pulses: Normal pulses  Pulmonary:      Effort: Pulmonary effort is normal  No respiratory distress     Musculoskeletal:         " General: Tenderness (severe pain surrounging the right ankle, difficult to isolate sources of pain due to her guarding and pain during exam) present  No deformity  Skin:     Capillary Refill: Capillary refill takes less than 2 seconds  Findings: No erythema  Neurological:      Mental Status: She is alert and oriented to person, place, and time        Gait: Gait abnormal        Severe pain when flexing the great to in arch and posterior ankle along FHL track correlating to the tendonitis seen on MRI

## 2023-06-26 ENCOUNTER — OFFICE VISIT (OUTPATIENT)
Dept: PODIATRY | Facility: CLINIC | Age: 28
End: 2023-06-26

## 2023-06-26 VITALS
SYSTOLIC BLOOD PRESSURE: 140 MMHG | DIASTOLIC BLOOD PRESSURE: 80 MMHG | HEIGHT: 68 IN | WEIGHT: 221 LBS | BODY MASS INDEX: 33.49 KG/M2

## 2023-06-26 DIAGNOSIS — M77.8 FLEXOR HALLUCIS LONGUS TENDONITIS: Primary | ICD-10-CM

## 2023-06-26 DIAGNOSIS — S99.911A RIGHT ANKLE INJURY, INITIAL ENCOUNTER: ICD-10-CM

## 2023-06-26 DIAGNOSIS — M79.661 PAIN IN RIGHT LOWER LEG: ICD-10-CM

## 2023-06-26 DIAGNOSIS — G57.51 TARSAL TUNNEL SYNDROME OF RIGHT SIDE: ICD-10-CM

## 2023-06-26 PROCEDURE — 99213 OFFICE O/P EST LOW 20 MIN: CPT | Performed by: PODIATRIST

## 2023-06-26 RX ORDER — METHOCARBAMOL 750 MG/1
750 TABLET, FILM COATED ORAL EVERY 6 HOURS PRN
Qty: 30 TABLET | Refills: 1 | Status: SHIPPED | OUTPATIENT
Start: 2023-06-26

## 2023-06-26 NOTE — PROGRESS NOTES
Assessment/Plan:      Diagnoses and all orders for this visit:    Flexor hallucis longus tendonitis  -     Ambulatory Referral to Podiatry; Future    Right ankle injury, initial encounter  -     Ambulatory Referral to Podiatry; Future    Tarsal tunnel syndrome of right side  -     Ambulatory Referral to Podiatry; Future    Pain in right lower leg  -     methocarbamol (ROBAXIN) 750 mg tablet; Take 1 tablet (750 mg total) by mouth every 6 (six) hours as needed for muscle spasms      FHL tenosynovitis only mildly improved  She has no improvement with immobilization, steroid (oral and injected), muscle relaxer, period of NWB  Her symptoms suggest FHL tendonitis but her severe level of pain is unusual, especially given there was no initial injury  Outside of exploratory surgery of the tarsal tunnel I am unsure how to proceed  I recommend she seek another opinion to investigate Tarsal Tunnel  It is difficult or isolate pain as she is very sensitive to touch but this may have a component of tarsal tunnel syndrome  Subjective:     Patient ID: Araceli Gitelman is a 29 y o  female  6/5/2023: Patient presents with severe right ankle pain  She has been to the Emergency room twice  She began having ankle pain 5/24/2023 but didn't get injured  The pain just showed up out of nowhere  She went to the ED 5/26/2023, XRays were normal  She went back a few days later because the pain was so severe  She was placed in a splint and given naproxen which doesn't help       6/12/2023  PAtient was ordered an MRI for her severe ankle pain  It is not getting better  A steroid taper didn't help  She has taken numerous pain medications, muscle relaxers and NSAIDs without relief  Pain is severe surrounding the entire ankle  She is unable to put any weight on the ankle  6/26/2023: Patient ws given a cortisone injection to her FHL tendon sheath at the medial ankle during last visit   She does note significant improvement in pain but still cannot walk     used throughout visit #[id number]  Review of Systems    Constitutional: Negative  Respiratory: Negative for cough and shortness of breath  Gastrointestinal: Negative for diarrhea, nausea and vomiting  Musculoskeletal: ankle pain  Skin: Negative for rash or wound  Neurological: Negative for weakness, numbness and headaches  Objective:     Physical Exam  Vitals reviewed  Cardiovascular:      Rate and Rhythm: Normal rate  Pulses: Normal pulses  Pulmonary:      Effort: Pulmonary effort is normal  No respiratory distress  Musculoskeletal:         General: Swelling and tenderness (sharp pain medial ankle and posterior lower leg with FHL stress  Positive tinel sign) present  No deformity  Skin:     Capillary Refill: Capillary refill takes less than 2 seconds  Findings: No erythema  Neurological:      Mental Status: She is alert and oriented to person, place, and time  Sensory: No sensory deficit

## 2023-06-27 ENCOUNTER — OFFICE VISIT (OUTPATIENT)
Dept: PODIATRY | Facility: CLINIC | Age: 28
End: 2023-06-27

## 2023-06-27 VITALS
SYSTOLIC BLOOD PRESSURE: 117 MMHG | HEIGHT: 68 IN | BODY MASS INDEX: 33.49 KG/M2 | DIASTOLIC BLOOD PRESSURE: 82 MMHG | HEART RATE: 79 BPM | WEIGHT: 221 LBS

## 2023-06-27 DIAGNOSIS — G57.51 TARSAL TUNNEL SYNDROME OF RIGHT SIDE: ICD-10-CM

## 2023-06-27 DIAGNOSIS — S99.911A RIGHT ANKLE INJURY, INITIAL ENCOUNTER: ICD-10-CM

## 2023-06-27 DIAGNOSIS — M79.661 PAIN IN RIGHT LOWER LEG: ICD-10-CM

## 2023-06-27 DIAGNOSIS — M77.8 FLEXOR HALLUCIS LONGUS TENDONITIS: Primary | ICD-10-CM

## 2023-06-27 PROCEDURE — 99214 OFFICE O/P EST MOD 30 MIN: CPT | Performed by: PODIATRIST

## 2023-06-27 NOTE — PROGRESS NOTES
PATIENT:  Krystle Sommers  1995       ASSESSMENT:     1  Flexor hallucis longus tendonitis  Ambulatory referral to Physical Therapy      2  Tarsal tunnel syndrome of right side  Ambulatory referral to Physical Therapy      3  Pain in right lower leg                  PLAN:  1  Patient seen with   Reviewed medical records  Reviewed / discussed images including X-ray and MRI  Reviewed the notes from ED and podiatry  Patient was counseled and educated on the condition and the diagnosis  2  The diagnosis, treatment options and prognosis were discussed with the patient  3  Pain is still significant without major trauma  Most of her pain is around right medial ankle  Most likely consistent with flexor tenosynovitis and probable tibial nerve injury / tarsal tunnel syndrome  Still very difficult to fully examine and assess her condition because she was guarding due to pain  4  Referred her to PT/OT  Continue CAM walker  Compression with ACE wrap     5  Instructed supportive care, home exercise, icing, and resting  Consider EMG study  6  Continue Ibuprofen and Robaxin  7  Pt to stay out of work  8  Patient will return in 5 weeks for re-evaluation  Imaging: I have personally reviewed pertinent films in PACS  Labs, pathology, and Other Studies: I have personally reviewed pertinent reports  Subjective:       HPI  The patient presents for a second opinion  She has severe pain in right ankle for about 1 month  She was seen in ED and by Dr Pb Purcell since the onset  She had injection and CAM walker with mild relief of pain  She does not recall actual injury to right ankle, but had overuse  No significant numbness        The following portions of the patient's history were reviewed and updated as appropriate: allergies, current medications, past family history, past medical history, past social history, past surgical history and problem list   All "pertinent labs and images were reviewed  Past Medical History  History reviewed  No pertinent past medical history  Past Surgical History  History reviewed  No pertinent surgical history  Allergies:  Amoxicillin and Penicillins    Medications:  Current Outpatient Medications   Medication Sig Dispense Refill   • methocarbamol (ROBAXIN) 750 mg tablet Take 1 tablet (750 mg total) by mouth every 6 (six) hours as needed for muscle spasms 30 tablet 1     No current facility-administered medications for this visit  Social History:  Social History     Socioeconomic History   • Marital status: Single     Spouse name: None   • Number of children: None   • Years of education: None   • Highest education level: None   Occupational History   • None   Tobacco Use   • Smoking status: Never   • Smokeless tobacco: Never   Vaping Use   • Vaping Use: Never used   Substance and Sexual Activity   • Alcohol use: Yes     Comment: social   • Drug use: Never   • Sexual activity: None   Other Topics Concern   • None   Social History Narrative   • None     Social Determinants of Health     Financial Resource Strain: Not on file   Food Insecurity: Not on file   Transportation Needs: Not on file   Physical Activity: Not on file   Stress: Not on file   Social Connections: Not on file   Intimate Partner Violence: Not on file   Housing Stability: Not on file          Review of Systems   Constitutional: Negative for chills and fever  Respiratory: Negative for cough and shortness of breath  Cardiovascular: Negative for chest pain  Gastrointestinal: Negative for nausea and vomiting  Musculoskeletal: Positive for gait problem  Skin: Negative for wound  Neurological: Negative for numbness  Hematological: Negative  Objective:      /82   Pulse 79   Ht 5' 8\" (1 727 m)   Wt 100 kg (221 lb)   LMP  (LMP Unknown)   BMI 33 60 kg/m²          Physical Exam  Vitals reviewed     Constitutional:       General: She " is not in acute distress  Appearance: She is not toxic-appearing or diaphoretic  HENT:      Head: Normocephalic and atraumatic  Eyes:      Extraocular Movements: Extraocular movements intact  Cardiovascular:      Rate and Rhythm: Normal rate and regular rhythm  Pulses: Normal pulses  Dorsalis pedis pulses are 2+ on the right side and 2+ on the left side  Posterior tibial pulses are 2+ on the right side and 2+ on the left side  Pulmonary:      Effort: Pulmonary effort is normal  No respiratory distress  Musculoskeletal:         General: Swelling and tenderness present  No deformity or signs of injury  Cervical back: Normal range of motion and neck supple  Left lower leg: No edema  Right foot: No foot drop  Left foot: No foot drop  Comments: Diffuse pain presents along the flexor of right medial ankle and randolph pedis  Diffuse swelling in right medial ankle and retromalleolar area  Minimal pain at right sinus tarsi or ATFL  Tenderness and mild Tinel sign at right tarsal tunnel  MMT / ROM exam guarded due to pain in right ankle/ foot  Skin:     General: Skin is warm  Capillary Refill: Capillary refill takes less than 2 seconds  Coloration: Skin is not cyanotic or mottled  Findings: No abscess, ecchymosis, erythema or wound  Nails: There is no clubbing  Neurological:      General: No focal deficit present  Mental Status: She is alert and oriented to person, place, and time  Cranial Nerves: No cranial nerve deficit  Sensory: No sensory deficit  Coordination: Coordination normal    Psychiatric:         Mood and Affect: Mood normal          Behavior: Behavior normal          Thought Content:  Thought content normal          Judgment: Judgment normal

## 2023-07-07 ENCOUNTER — EVALUATION (OUTPATIENT)
Dept: PHYSICAL THERAPY | Facility: MEDICAL CENTER | Age: 28
End: 2023-07-07

## 2023-07-07 DIAGNOSIS — G57.51 TARSAL TUNNEL SYNDROME OF RIGHT SIDE: ICD-10-CM

## 2023-07-07 DIAGNOSIS — M77.8 FLEXOR HALLUCIS LONGUS TENDONITIS: Primary | ICD-10-CM

## 2023-07-07 PROCEDURE — 97161 PT EVAL LOW COMPLEX 20 MIN: CPT | Performed by: PHYSICAL MEDICINE & REHABILITATION

## 2023-07-07 NOTE — PROGRESS NOTES
PT Evaluation     Today's date: 2023  Patient name: Francy Henderson  : 1995  MRN: [id number]  Referring provider: Glenroy Sneed DPM  Dx:   Encounter Diagnosis     ICD-10-CM    1. Flexor hallucis longus tendonitis  M77.8 Ambulatory referral to Physical Therapy      2. Tarsal tunnel syndrome of right side  G57.51 Ambulatory referral to Physical Therapy                     Assessment  Assessment details: Patient is a 29 y.o. female who reports to outpatient physical therapy with chronic ankle pain. No further referral appears necessary at this time based upon examination results. Probable movement impairment diagnosis of decreased muscular coordination resulting in pathoanatomical symptoms of pain, decreased ROM and decreased strength and limiting ability to ambulate and work. Skilled physical therapy is warranted for the application of the interventions found below in the planned intervention section. Prognosis is good given HEP compliance and attendance to physical therapy 2x for 8 weeks. Please contact me if you have any questions or recommendations. Thank you for the referral and the opportunity to share in [facility] care. Patient verbalized understanding of POC, HEP, and return demonstrated HEP. Impairments: abnormal coordination, abnormal gait, abnormal muscle firing, abnormal or restricted ROM, abnormal movement, activity intolerance, impaired balance, impaired physical strength, lacks appropriate home exercise program, pain with function and weight-bearing intolerance  Understanding of Dx/Px/POC: good   Prognosis: good    Goals  Impairment Goals  - Decrease pain by 50% in 4 weeks. - Increase right flexibility by 50% in 4 weeks. - Increase right lower extremity strength golbally to 4/5 in 8 weeks.     Functional Goals  - Return to Prior Level of Function in 12 weeks  - Patient will be independent with HEP in 12 weeks    Plan  Patient would benefit from: skilled PT  Planned modality interventions: cryotherapy  Planned therapy interventions: joint mobilization, manual therapy, neuromuscular re-education, patient education, strengthening, stretching, therapeutic activities, therapeutic exercise, home exercise program, functional ROM exercises, Ordoñez taping and postural training  Frequency: 2-3x week.   Treatment plan discussed with: patient        Subjective Evaluation    History of Present Illness  Mechanism of injury: Patient reported "in may I had an accident while exercising, my ankle has hurt a lot since."    Patient reports wearing a CAM boot and that she has not been moving her ankle for many months  Pain  Current pain ratin  At best pain ratin  At worst pain ratin    Patient Goals  Patient goal: return to work        Objective     Static Posture     Comments  ROM: R  EV: 2  IV: 6  PF: 6  DF: -4    MMTS: R  EV: 2-/5  IV: 2-/5  PF: 2-/5  DF:  Trace/5    Unable to bear weight    Figure 8: 29cm             Precautions: latex allergy      Manuals 2023                                                                Neuro Re-Ed                                                                                           HEP and Return Demo 10'            Ther Ex                                                                                                                     Ther Activity                                       Gait Training                                       Modalities

## 2023-07-07 NOTE — LETTER
2023    Navid Mcleod, [address] [facility]    Patient: Marita Madrigal   YOB: 1995   Date of Visit: 2023     Encounter Diagnosis     ICD-10-CM    1. Flexor hallucis longus tendonitis  M77.8 Ambulatory referral to Physical Therapy      2. Tarsal tunnel syndrome of right side  G57.51 Ambulatory referral to Physical Therapy          Dear Dr. Uma Weinberg:    Thank you for your recent referral of Marita Madrigal. Please review the attached evaluation summary from [facility]'s recent visit. Please verify that you agree with the plan of care by signing the attached order. If you have any questions or concerns, please do not hesitate to call. I sincerely appreciate the opportunity to share in the care of one of your patients and hope to have another opportunity to work with you in the near future. Sincerely,    Lina Bazan      Referring Provider:      I certify that I have read the below Plan of Care and certify the need for these services furnished under this plan of treatment while under my care. Navid Mcleod DPM  [address] [place] [address]  [facility] [facility] [place]          PT Evaluation     Today's date: 2023  Patient name: Marita Madrigal  : 1995  MRN: [id number]  Referring provider: Navid Mcleod DPM  Dx:   Encounter Diagnosis     ICD-10-CM    1. Flexor hallucis longus tendonitis  M77.8 Ambulatory referral to Physical Therapy      2. Tarsal tunnel syndrome of right side  G57.51 Ambulatory referral to Physical Therapy                     Assessment  Assessment details: Patient is a 29 y.o. female who reports to outpatient physical therapy with chronic ankle pain. No further referral appears necessary at this time based upon examination results. Probable movement impairment diagnosis of decreased muscular coordination resulting in pathoanatomical symptoms of pain, decreased ROM and decreased strength and limiting ability to ambulate and work. Skilled physical therapy is warranted for the application of the interventions found below in the planned intervention section. Prognosis is good given HEP compliance and attendance to physical therapy 2x for 8 weeks. Please contact me if you have any questions or recommendations. Thank you for the referral and the opportunity to share in [facility]'s care. Patient verbalized understanding of POC, HEP, and return demonstrated HEP. Impairments: abnormal coordination, abnormal gait, abnormal muscle firing, abnormal or restricted ROM, abnormal movement, activity intolerance, impaired balance, impaired physical strength, lacks appropriate home exercise program, pain with function and weight-bearing intolerance  Understanding of Dx/Px/POC: good   Prognosis: good    Goals  Impairment Goals  - Decrease pain by 50% in 4 weeks. - Increase right flexibility by 50% in 4 weeks. - Increase right lower extremity strength golbally to 4/5 in 8 weeks. Functional Goals  - Return to Prior Level of Function in 12 weeks  - Patient will be independent with HEP in 12 weeks    Plan  Patient would benefit from: skilled PT  Planned modality interventions: cryotherapy  Planned therapy interventions: joint mobilization, manual therapy, neuromuscular re-education, patient education, strengthening, stretching, therapeutic activities, therapeutic exercise, home exercise program, functional ROM exercises, Ordoñez taping and postural training  Frequency: 2-3x week.   Treatment plan discussed with: patient        Subjective Evaluation    History of Present Illness  Mechanism of injury: Patient reported "in may I had an accident while exercising, my ankle has hurt a lot since."    Patient reports wearing a CAM boot and that she has not been moving her ankle for many months  Pain  Current pain ratin  At best pain ratin  At worst pain ratin    Patient Goals  Patient goal: return to work        Objective     Static Posture Comments  ROM: R  EV: 2  IV: 6  PF: 6  DF: -4    MMTS: R  EV: 2-/5  IV: 2-/5  PF: 2-/5  DF:  Trace/5    Unable to bear weight    Figure 8: 29cm            Precautions: latex allergy      Manuals 7/7/2023                                                                Neuro Re-Ed                                                                                           HEP and Return Demo 10'            Ther Ex                                                                                                                     Ther Activity                                       Gait Training                                       Modalities

## 2023-07-12 ENCOUNTER — OFFICE VISIT (OUTPATIENT)
Dept: PHYSICAL THERAPY | Facility: MEDICAL CENTER | Age: 28
End: 2023-07-12

## 2023-07-12 DIAGNOSIS — G57.51 TARSAL TUNNEL SYNDROME OF RIGHT SIDE: ICD-10-CM

## 2023-07-12 DIAGNOSIS — M77.8 FLEXOR HALLUCIS LONGUS TENDONITIS: Primary | ICD-10-CM

## 2023-07-12 PROCEDURE — 97112 NEUROMUSCULAR REEDUCATION: CPT | Performed by: PHYSICAL MEDICINE & REHABILITATION

## 2023-07-14 NOTE — PROGRESS NOTES
Daily Note     Today's date: 2023  Patient name: John Felix  : 1995  MRN: [id number]  Referring provider: Barbara Boyd DPM  Dx:   Encounter Diagnosis     ICD-10-CM    1. 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 Tarsal tunnel syndrome of right side  G57.51                      Subjective: Skarlin reported "I am doing a little better."      Objective: See treatment diary below      Assessment: Tolerated treatment well. Patient would benefit from continued PT. John Felix was able to initiate her therapy program which shows progress towards her goals. She demonstrated increased inversion and eversion compared to IE. Following fibular mobilization she demonstrated an increase in DF which shows progress towards her goals. Plan: Continue per plan of care.       Precautions: latex allergy      Manuals 2023           Fibular mobs  JR           PROM  JR                                     Neuro Re-Ed                                                                                           HEP and Return Demo 10'            Ther Ex             4x way ankle  yellow           Ball roll  3x 30"           Strap calf stretch  4x20"                                                                            Ther Activity                                       Gait Training                                       Modalities

## 2023-07-19 ENCOUNTER — OFFICE VISIT (OUTPATIENT)
Dept: PHYSICAL THERAPY | Facility: MEDICAL CENTER | Age: 28
End: 2023-07-19

## 2023-07-19 DIAGNOSIS — M77.8 FLEXOR HALLUCIS LONGUS TENDONITIS: Primary | ICD-10-CM

## 2023-07-19 DIAGNOSIS — G57.51 TARSAL TUNNEL SYNDROME OF RIGHT SIDE: ICD-10-CM

## 2023-07-19 PROCEDURE — 97112 NEUROMUSCULAR REEDUCATION: CPT | Performed by: PHYSICAL MEDICINE & REHABILITATION

## 2023-07-19 NOTE — PROGRESS NOTES
Daily Note     Today's date: 2023  Patient name: Edward Juarez  : 1995  MRN: [id number]  Referring provider: Elizabeth Oseguera DPM  Dx:   Encounter Diagnosis     ICD-10-CM    1. 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 Tarsal tunnel syndrome of right side  G57.51                      Subjective: Caitie reported "I am having a lot of pain."      Objective: See treatment diary below      Assessment: Tolerated treatment well. Patient would benefit from continued PT. She was able to advance her therapy program as seen below. Plan: Continue per plan of care.       Precautions: latex allergy      Manuals 2023          Fibular mobs  JR JR          PROM  JR JR                                    Neuro Re-Ed             Seated Heel/toe raises   3x10          Towel Scrunches   3x10                                                              HEP and Return Demo 10'            Ther Ex             4x way ankle  yellow [place] 3x10          Ball roll  3x 30" 3x 30"          Strap calf stretch  4x20" 4x20"          ABCs   3x          AAROM   3x10 ea                                                 Ther Activity                                       Gait Training                                       Modalities

## 2023-07-26 ENCOUNTER — OFFICE VISIT (OUTPATIENT)
Dept: PHYSICAL THERAPY | Facility: MEDICAL CENTER | Age: 28
End: 2023-07-26

## 2023-07-26 DIAGNOSIS — M77.8 FLEXOR HALLUCIS LONGUS TENDONITIS: Primary | ICD-10-CM

## 2023-07-26 DIAGNOSIS — G57.51 TARSAL TUNNEL SYNDROME OF RIGHT SIDE: ICD-10-CM

## 2023-07-26 PROCEDURE — 97112 NEUROMUSCULAR REEDUCATION: CPT | Performed by: PHYSICAL MEDICINE & REHABILITATION

## 2023-07-26 NOTE — PROGRESS NOTES
Daily Note     Today's date: 2023  Patient name: Bao Matthew  : 1995  MRN: [id number]  Referring provider: Juan Solano DPM  Dx:   Encounter Diagnosis     ICD-10-CM    1. 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 Tarsal tunnel syndrome of right side  G57.51                      Subjective: Skarlin reported "      Objective: See treatment diary below      Assessment: Tolerated treatment well. Patient would benefit from continued PT. She was able to progress her therapy program as seen below. Plan: Continue per plan of care.       Precautions: latex allergy      Manuals 2023         Fibular mobs   JR JR         PROM  JR JR JR                                   Neuro Re-Ed             Seated Heel/toe raises   3x10 3x10         Towel Scrunches   3x10 3x10         Standing Heel Raises    3x10                                                HEP and Return Demo 10'            Ther Ex             4x way ankle  yellow Butts 3x10 Butts 3x10         Ball roll  3x 30" 3x 30" 3x 30"         Strap calf stretch  4x20" 4x20" 4x20"         ABCs   3x 3x         AAROM   3x10 ea 3x10 EA                                                Ther Activity                                       Gait Training                                       Modalities

## 2023-08-01 ENCOUNTER — OFFICE VISIT (OUTPATIENT)
Dept: PODIATRY | Facility: CLINIC | Age: 28
End: 2023-08-01

## 2023-08-01 VITALS
BODY MASS INDEX: 33.49 KG/M2 | WEIGHT: 221 LBS | HEIGHT: 68 IN | DIASTOLIC BLOOD PRESSURE: 91 MMHG | SYSTOLIC BLOOD PRESSURE: 143 MMHG | HEART RATE: 76 BPM

## 2023-08-01 DIAGNOSIS — M77.8 FLEXOR HALLUCIS LONGUS TENDONITIS: Primary | ICD-10-CM

## 2023-08-01 DIAGNOSIS — G57.51 TARSAL TUNNEL SYNDROME OF RIGHT SIDE: ICD-10-CM

## 2023-08-01 PROCEDURE — 99213 OFFICE O/P EST LOW 20 MIN: CPT | Performed by: PODIATRIST

## 2023-08-01 NOTE — PROGRESS NOTES
PATIENT:  Cisco Jain  1995       ASSESSMENT:     1. Flexor hallucis longus tendonitis        2. Tarsal tunnel syndrome of right side                  PLAN:  1. Patient seen with . Reviewed medical records. Reviewed the notes from PT. Patient was counseled and educated on the condition and the diagnosis. 2. The diagnosis, treatment options and prognosis were discussed with the patient. 3. She is doing well. Advance shoes as tolerated. Try ankle brace / support. Continue PT.  4. Instructed supportive care, home exercise, icing, and resting. 5. Pt to return for any increased symptoms in the next few weeks. Imaging: I have personally reviewed pertinent films in PACS  Labs, pathology, and Other Studies: I have personally reviewed pertinent reports. Subjective:       HPI  The patient presents for follow-up. Her pain is much better. It is 80% better since the last visit. She presents with sandals today. No new complaint. The following portions of the patient's history were reviewed and updated as appropriate: allergies, current medications, past family history, past medical history, past social history, past surgical history and problem list.  All pertinent labs and images were reviewed. Past Medical History  History reviewed. No pertinent past medical history. Past Surgical History  History reviewed. No pertinent surgical history. Allergies:  Amoxicillin and Penicillins    Medications:  Current Outpatient Medications   Medication Sig Dispense Refill   • methocarbamol (ROBAXIN) 750 mg tablet Take 1 tablet (750 mg total) by mouth every 6 (six) hours as needed for muscle spasms 30 tablet 1     No current facility-administered medications for this visit.        Social History:  Social History     Socioeconomic History   • Marital status: Single     Spouse name: None   • Number of children: None   • Years of education: None   • Highest education level: None   Occupational History   • None   Tobacco Use   • Smoking status: Never   • Smokeless tobacco: Never   Vaping Use   • Vaping Use: Never used   Substance and Sexual Activity   • Alcohol use: Yes     Comment: social   • Drug use: Never   • Sexual activity: None   Other Topics Concern   • None   Social History Narrative   • None     Social Determinants of Health     Financial Resource Strain: Not on file   Food Insecurity: Not on file   Transportation Needs: Not on file   Physical Activity: Not on file   Stress: Not on file   Social Connections: Not on file   Intimate Partner Violence: Not on file   Housing Stability: Not on file          Review of Systems   Constitutional: Negative for chills and fever. Respiratory: Negative for cough and shortness of breath. Cardiovascular: Negative for chest pain. Gastrointestinal: Negative for nausea and vomiting. Skin: Negative for wound. Neurological: Negative for numbness. Hematological: Negative. Objective:      /91   Pulse 76   Ht 5' 8" (1.727 m)   Wt 100 kg (221 lb)   BMI 33.60 kg/m²          Physical Exam  Vitals reviewed. Constitutional:       General: She is not in acute distress. Appearance: She is not toxic-appearing or diaphoretic. Cardiovascular:      Rate and Rhythm: Normal rate and regular rhythm. Pulses: Normal pulses. Dorsalis pedis pulses are 2+ on the right side and 2+ on the left side. Posterior tibial pulses are 2+ on the right side and 2+ on the left side. Pulmonary:      Effort: Pulmonary effort is normal. No respiratory distress. Musculoskeletal:         General: No deformity or signs of injury. Left lower leg: No edema. Right foot: No foot drop. Left foot: No foot drop. Comments: Minimal pain presents along the flexor of right medial ankle and randolph pedis. Minimal swelling in right medial ankle and retromalleolar area.   Mild Tinel sign right tarsal tunnel. Skin:     General: Skin is warm. Capillary Refill: Capillary refill takes less than 2 seconds. Coloration: Skin is not cyanotic or mottled. Findings: No abscess, ecchymosis, erythema or wound. Nails: There is no clubbing. Neurological:      General: No focal deficit present. Mental Status: She is alert and oriented to person, place, and time. Cranial Nerves: No cranial nerve deficit. Sensory: No sensory deficit. Coordination: Coordination normal.   Psychiatric:         Mood and Affect: Mood normal.         Behavior: Behavior normal.         Thought Content:  Thought content normal.         Judgment: Judgment normal.

## 2023-08-02 ENCOUNTER — OFFICE VISIT (OUTPATIENT)
Dept: PHYSICAL THERAPY | Facility: MEDICAL CENTER | Age: 28
End: 2023-08-02

## 2023-08-02 DIAGNOSIS — M77.8 FLEXOR HALLUCIS LONGUS TENDONITIS: Primary | ICD-10-CM

## 2023-08-02 DIAGNOSIS — G57.51 TARSAL TUNNEL SYNDROME OF RIGHT SIDE: ICD-10-CM

## 2023-08-02 PROCEDURE — 97112 NEUROMUSCULAR REEDUCATION: CPT | Performed by: PHYSICAL MEDICINE & REHABILITATION

## 2023-08-02 NOTE — PROGRESS NOTES
Daily Note     Today's date: 2023  Patient name: Kvng Lerma  : 1995  MRN: [id number]  Referring provider: Atif Miles DPM  Dx:   Encounter Diagnosis     ICD-10-CM    1. 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 Tarsal tunnel syndrome of right side  G57.51                      Subjective: Caitie reported "I am doing better."      Objective: See treatment diary below      Assessment: Tolerated treatment well. Patient would benefit from continued PT. Caitie demonstrated increased weight bearing and was able to progress her balance training which shows progress towards her goals. Plan: Continue per plan of care.       Precautions: latex allergy      Manuals 2023        Fibular mobsoto ROGERS JR JR JR        PROM  [address] [facility]                                  Neuro Re-Ed             Seated Heel/toe raises   3x10 3x10 3x10        Towel Scrunches   3x10 3x10 3x10        Standing Heel Raises    3x10 3x10                                  Balance training     10'        HEP and Return Demo 10'            Ther Ex             4x way ankle  yellow Gilmer 3x10 Gilmer 3x10 Gilmer 3x10        Ball roll  3x 30" 3x 30" 3x 30" 3x 30"        Strap calf stretch  4x20" 4x20" 4x20" 4x20"        ABCs   3x 3x 3x        AAROM   3x10 ea 3x10 EA 3x10 EA                                               Ther Activity                                       Gait Training                                       Modalities
Xray Elbow AP + Lateral, Right

## 2023-08-09 ENCOUNTER — APPOINTMENT (OUTPATIENT)
Dept: PHYSICAL THERAPY | Facility: MEDICAL CENTER | Age: 28
End: 2023-08-09

## 2023-08-16 ENCOUNTER — OFFICE VISIT (OUTPATIENT)
Dept: PHYSICAL THERAPY | Facility: MEDICAL CENTER | Age: 28
End: 2023-08-16

## 2023-08-16 DIAGNOSIS — M77.8 FLEXOR HALLUCIS LONGUS TENDONITIS: Primary | ICD-10-CM

## 2023-08-16 DIAGNOSIS — G57.51 TARSAL TUNNEL SYNDROME OF RIGHT SIDE: ICD-10-CM

## 2023-08-16 PROCEDURE — 97112 NEUROMUSCULAR REEDUCATION: CPT | Performed by: PHYSICAL MEDICINE & REHABILITATION

## 2023-08-23 NOTE — PROGRESS NOTES
Daily Note     Today's date: 2023  Patient name: Jj Erwin  : 1995  MRN: [id number]  Referring provider: Krish Mercer DPM  Dx:   Encounter Diagnosis     ICD-10-CM    1. 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 Tarsal tunnel syndrome of right side  G57.51                      Subjective: Skarlin reported "I am doing better."      Objective: See treatment diary below      Assessment: Tolerated treatment well. Patient would benefit from continued PT. She reported pain with her gait training but she was able to progress her ambulation which shows progress towards her goals. Plan: Continue per plan of care.       Precautions: latex allergy      Manuals 2023        Fibular mobsoto ROGERS JR JR JR        PROM  [address] [facility]                                  Neuro Re-Ed             Seated Heel/toe raises   3x10 3x10 3x10        Towel Scrunches   3x10 3x10 3x10        Standing Heel Raises    3x10 3x10                                  Balance training     10'        HEP and Return Demo 10'            Ther Ex             4x way ankle  yellow Brown 3x10 Brown 3x10 Brown 3x10        Ball roll  3x 30" 3x 30" 3x 30" 3x 30"        Strap calf stretch  4x20" 4x20" 4x20" 4x20"        ABCs   3x 3x 3x        AAROM   3x10 ea 3x10 EA 3x10 EA                                               Ther Activity                                       Gait Training             Heel to toe     5'                     Modalities

## 2023-08-30 ENCOUNTER — APPOINTMENT (OUTPATIENT)
Dept: PHYSICAL THERAPY | Facility: MEDICAL CENTER | Age: 28
End: 2023-08-30